# Patient Record
Sex: FEMALE | Race: BLACK OR AFRICAN AMERICAN | Employment: OTHER | URBAN - NONMETROPOLITAN AREA
[De-identification: names, ages, dates, MRNs, and addresses within clinical notes are randomized per-mention and may not be internally consistent; named-entity substitution may affect disease eponyms.]

---

## 2024-11-06 ENCOUNTER — HOSPITAL ENCOUNTER (EMERGENCY)
Facility: HOSPITAL | Age: 79
Discharge: NURSING, REHAB OR LONG-TERM CARE | End: 2024-11-06
Payer: MEDICARE

## 2024-11-06 VITALS
SYSTOLIC BLOOD PRESSURE: 148 MMHG | WEIGHT: 123 LBS | HEIGHT: 64 IN | OXYGEN SATURATION: 99 % | DIASTOLIC BLOOD PRESSURE: 67 MMHG | RESPIRATION RATE: 18 BRPM | HEART RATE: 69 BPM | TEMPERATURE: 97 F | BODY MASS INDEX: 21 KG/M2

## 2024-11-06 DIAGNOSIS — R29.6 UNWITNESSED FALL: Primary | ICD-10-CM

## 2024-11-06 DIAGNOSIS — S00.83XA FOREHEAD CONTUSION, INITIAL ENCOUNTER: ICD-10-CM

## 2024-11-06 PROCEDURE — 99285 EMERGENCY DEPT VISIT HI MDM: CPT | Mod: 25

## 2024-11-06 PROCEDURE — 99284 EMERGENCY DEPT VISIT MOD MDM: CPT | Mod: GF | Performed by: NURSE PRACTITIONER

## 2024-11-06 RX ORDER — NAPROXEN SODIUM 220 MG/1
81 TABLET, FILM COATED ORAL DAILY
COMMUNITY

## 2024-11-06 RX ORDER — METOPROLOL TARTRATE 50 MG/1
50 TABLET ORAL 2 TIMES DAILY
COMMUNITY

## 2024-11-06 RX ORDER — QUETIAPINE FUMARATE 25 MG/1
25 TABLET, FILM COATED ORAL 2 TIMES DAILY
COMMUNITY

## 2024-11-06 RX ORDER — TRAZODONE HYDROCHLORIDE 50 MG/1
50 TABLET ORAL NIGHTLY
COMMUNITY

## 2024-11-06 RX ORDER — POTASSIUM CHLORIDE 750 MG/1
10 CAPSULE, EXTENDED RELEASE ORAL ONCE
COMMUNITY

## 2024-11-06 RX ORDER — ATORVASTATIN CALCIUM 40 MG/1
40 TABLET, FILM COATED ORAL DAILY
COMMUNITY

## 2024-11-06 RX ORDER — LEVETIRACETAM 500 MG/1
500 TABLET ORAL 2 TIMES DAILY
COMMUNITY

## 2024-11-06 RX ORDER — HYDROCHLOROTHIAZIDE 25 MG/1
25 TABLET ORAL DAILY
COMMUNITY

## 2024-11-06 NOTE — ED NOTES
Dispatch notified of need for transport to [facility]. Awaiting [facility] arrival.

## 2024-11-06 NOTE — ED TRIAGE NOTES
Received call from Leela at [facility].   States patient was in bed when the aide went into the room to check on her. Patient was found on the floor.    Patient has hematoma to left forehead with no other complaints.  Int to left hand on arrival to emergency department.    Medical and surgical hx received from Leela at [facility].

## 2024-11-06 NOTE — DISCHARGE INSTRUCTIONS
Tylenol as needed for pain. Ice in short intervals to affected area. Follow up with her primary care provider if no improvement or otherwise as needed. Return to the ED for worsening signs and symptoms or otherwise as needed.

## 2024-11-06 NOTE — ED NOTES
Patient is awake and alert trying to get out of bed.   Repositioned in bed and covered with blanket.

## 2024-11-07 ENCOUNTER — HOSPITAL ENCOUNTER (EMERGENCY)
Facility: HOSPITAL | Age: 79
Discharge: HOME | End: 2024-11-07
Payer: MEDICARE

## 2024-11-07 VITALS
BODY MASS INDEX: 21.48 KG/M2 | RESPIRATION RATE: 17 BRPM | OXYGEN SATURATION: 97 % | SYSTOLIC BLOOD PRESSURE: 229 MMHG | HEIGHT: 63 IN | WEIGHT: 121.25 LBS | HEART RATE: 74 BPM | DIASTOLIC BLOOD PRESSURE: 92 MMHG | TEMPERATURE: 98 F

## 2024-11-07 DIAGNOSIS — S09.90XA INJURY OF HEAD, INITIAL ENCOUNTER: ICD-10-CM
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
## 2024-11-07 PROCEDURE — 99282 EMERGENCY DEPT VISIT SF MDM: CPT | Mod: GF

## 2024-11-07 PROCEDURE — 99285 EMERGENCY DEPT VISIT HI MDM: CPT | Mod: 25

## 2024-11-07 PROCEDURE — G0390 TRAUMA RESPONS W/HOSP CRITI: HCPCS

## 2024-11-08 NOTE — DISCHARGE INSTRUCTIONS
- Follow up with pcp as needed   - Return to the ED if patient experiences any projectile vomiting and/or seizure activity.

## 2024-11-08 NOTE — ED PROVIDER NOTES
Encounter Date: 11/7/2024       History     Chief Complaint   Patient presents with    Fall    Head Injury     BM is a 80 y/o AAF with a PMHx significant for multiple health related co-morbidities presents to the ED via EMS s/p fall with head injury. All collateral information comes from the nursing home staff who stated patient fell and was found the floor. Patient has recurrent falls, patient was here yesterday for similar complaints. Nursing Home staff denies any LOC, patient has large hematoma to the right side of her forehead. [facility] trauma activation paged over head, patient is taking Eliquis due to A-Fib.         Review of patient's allergies indicates:  No Known Allergies  Past Medical History:   Diagnosis Date    Atrial fibrillation     Diabetes mellitus     GERD (gastroesophageal reflux disease)     Hypertension     Mixed hyperlipidemia     Seizures     Stroke      History reviewed. No pertinent surgical history.  No family history on file.  Social History     Tobacco Use    Smoking status: Never     Passive exposure: Never    Smokeless tobacco: Never   Substance Use Topics    Alcohol use: Never    Drug use: Never     Review of Systems   Constitutional: Negative.    HENT: Negative.     Eyes: Negative.    Respiratory: Negative.     Cardiovascular: Negative.    Gastrointestinal: Negative.    Endocrine: Negative.    Genitourinary: Negative.    Musculoskeletal: Negative.    Skin:  Positive for wound.        Hematoma right side of forehead.    Allergic/Immunologic: Negative.    Neurological: Negative.    Hematological: Negative.    Psychiatric/Behavioral: Negative.         Physical Exam     Initial Vitals [11/07/24 1930]   BP Pulse Resp Temp SpO2   (!) 207/87 72 16 97.8 °F (36.6 °C) 97 %      MAP       --         Physical Exam    Nursing note and vitals reviewed.  Constitutional: Vital signs are normal. She appears well-developed and well-nourished. She is not diaphoretic. She is cooperative.  Non-toxic  appearance. She does not have a sickly appearance. She does not appear ill. No distress.   HENT:   Head: Normocephalic and atraumatic.       Right Ear: Hearing, tympanic membrane, external ear and ear canal normal.   Left Ear: Hearing, tympanic membrane, external ear and ear canal normal.   Nose: Nose normal. Right sinus exhibits no maxillary sinus tenderness and no frontal sinus tenderness. Left sinus exhibits no maxillary sinus tenderness and no frontal sinus tenderness. Mouth/Throat: Uvula is midline, oropharynx is clear and moist and mucous membranes are normal.   Neck: Trachea normal and phonation normal. Neck supple. No thyroid mass and no thyromegaly present.   Normal range of motion.   Full passive range of motion without pain.     Cardiovascular:  Normal rate, regular rhythm, S1 normal, S2 normal, normal heart sounds and normal pulses.           Pulmonary/Chest: Effort normal and breath sounds normal.   Musculoskeletal:      Cervical back: Full passive range of motion without pain, normal range of motion and neck supple.     Neurological: She is alert and oriented to person, place, and time. She has normal strength and normal reflexes. She displays normal reflexes. No cranial nerve deficit or sensory deficit. She displays a negative Romberg sign. GCS eye subscore is 4. GCS verbal subscore is 5. GCS motor subscore is 6.   Skin: Skin is warm, dry and intact. Capillary refill takes less than 2 seconds. No rash noted.        Psychiatric: She has a normal mood and affect. Her speech is normal and behavior is normal. Judgment and thought content normal. Cognition and memory are normal.         Medical Screening Exam   See Full Note    ED Course   Procedures  Labs Reviewed - No data to display       Imaging Results              CT Head Without Contrast (Final result)  Result time 11/07/24 20:09:51      Final result by Fidel Dey MD (11/07/24 20:09:51)                   Impression:      Right frontal  subcutaneous hematoma.  No calvarial fracture or acute intracranial process.      Electronically signed by: Fidel Dey MD  Date:    11/07/2024  Time:    20:09               Narrative:    EXAMINATION:  CT HEAD WITHOUT CONTRAST    CLINICAL HISTORY:  fall with head injury.    TECHNIQUE:  Low dose axial images were obtained through the head.  Coronal and sagittal reformations were also performed. Contrast was not administered.    COMPARISON:  CT head dated 11/06/2024.    FINDINGS:  There is a right frontal subcutaneous hematoma.  The underlying calvarium is intact.  The paranasal sinuses are unremarkable.  The mastoid air cells are clear.  There are postoperative changes in the globes.    The craniocervical junction is intact.  The sellar and parasellar structures are within normal limits.  There is no evidence of intracranial hemorrhage.  There are no extra-axial fluid collections.    The ventricles and sulci are prominent, consistent cerebral volume loss.  There are hypodensities within the periventricular and subcortical white matter.  The gray-white differentiation is maintained.  There is no dense vessel sign.  There are calcifications of the skull base vessels.  There is no evidence of mass effect.                                    X-Rays:   Independently Interpreted Readings:   Other Readings:  ding Physician Reading Date Result Priority  Fidel Dey MD  [phone]  [phone] 11/7/2024 STAT    Narrative & Impression  EXAMINATION:  CT HEAD WITHOUT CONTRAST     CLINICAL HISTORY:  fall with head injury.     TECHNIQUE:  Low dose axial images were obtained through the head.  Coronal and sagittal reformations were also performed. Contrast was not administered.     COMPARISON:  CT head dated 11/06/2024.     FINDINGS:  There is a right frontal subcutaneous hematoma.  The underlying calvarium is intact.  The paranasal sinuses are unremarkable.  The mastoid air cells are clear.  There are postoperative changes  in the globes.     The craniocervical junction is intact.  The sellar and parasellar structures are within normal limits.  There is no evidence of intracranial hemorrhage.  There are no extra-axial fluid collections.     The ventricles and sulci are prominent, consistent cerebral volume loss.  There are hypodensities within the periventricular and subcortical white matter.  The gray-white differentiation is maintained.  There is no dense vessel sign.  There are calcifications of the skull base vessels.  There is no evidence of mass effect.     Impression:     Right frontal subcutaneous hematoma.  No calvarial fracture or acute intracranial process.        Electronically signed by:Fidel Dey MD  Date:                                            11/07/2024  Time:                                           20:09      Exam Ended: 11/07/24 19:54 CST        Medications - No data to display  Medical Decision Making  BM is a 80 y/o AAF with a PMHx significant for multiple health related co-morbidities presents to the ED via EMS s/p fall with head injury. All collateral information comes from the nursing home staff who stated patient fell and was found the floor. Patient has recurrent falls, patient was here yesterday for similar complaints. Nursing Home staff denies any LOC, patient has large hematoma to the right side of her forehead. [facility] trauma activation paged over head, patient is taking Eliquis due to A-Fib.           Amount and/or Complexity of Data Reviewed  Radiology: ordered. Decision-making details documented in ED Course.  Discussion of management or test interpretation with external provider(s): - Head injury s/p fall  - Fall  - Hematoma right forehead                                         Clinical Impression:   Final diagnoses:  [W19.XXXA] Fall, initial encounter (Primary)  [S09.90XA] Injury of head, initial encounter        ED Disposition Condition    Discharge Stable          ED Prescriptions    None        Follow-up Information       Follow up With Specialties Details Why Contact Info    local pcp   As needed, If symptoms worsen              Darren Betancourt FNP  11/07/24 2022

## 2024-11-08 NOTE — ED PROVIDER NOTES
Encounter Date: 11/6/2024       History     Chief Complaint   Patient presents with    Fall     Hematoma to left forehead     80 y/o AAF presents to the emergency department via EMS from local NH with c/o having an unwitnessed fall from her bed. She was given her night time medications and when the nursing staff went back to check on her and she was on the floor. No obvious injuries reported. Sent to the emergency department for further evaluation d/t patient being on Eliquis.    The history is provided by the nursing home and the EMS personnel.     Review of patient's allergies indicates:  No Known Allergies  Past Medical History:   Diagnosis Date    Atrial fibrillation     Diabetes mellitus     GERD (gastroesophageal reflux disease)     Hypertension     Mixed hyperlipidemia     Seizures     Stroke      History reviewed. No pertinent surgical history.  No family history on file.  Social History     Tobacco Use    Smoking status: Never     Passive exposure: Never    Smokeless tobacco: Never   Substance Use Topics    Alcohol use: Never    Drug use: Never     Review of Systems   All other systems reviewed and are negative.      Physical Exam     Initial Vitals [11/06/24 0100]   BP Pulse Resp Temp SpO2   (!) 146/69 72 18 97.4 °F (36.3 °C) 99 %      MAP       --         Physical Exam    Constitutional: She appears well-developed and well-nourished. She is easily aroused.  Non-toxic appearance.   HENT:   Head:       Eyes: Pupils are equal, round, and reactive to light.   Neck:    Full passive range of motion without pain.     Cardiovascular:  Regular rhythm and normal heart sounds.           Pulmonary/Chest: Effort normal and breath sounds normal.   Musculoskeletal:      Cervical back: Full passive range of motion without pain. No spinous process tenderness.     Neurological: She is easily aroused.   Drowsy but arousable  Mental status at baseline     Skin: Skin is warm, dry and intact. Capillary refill takes less than 2  seconds.              Medical Screening Exam   See Full Note    ED Course   Procedures  Labs Reviewed - No data to display       Imaging Results              CT Head Without Contrast (Final result)  Result time 11/06/24 04:59:30      Final result by Florentin Elliott MD (11/06/24 04:59:30)                   Impression:      No CT evidence of acute intracranial abnormality.    Generalized cerebral volume loss and chronic microvascular ischemic changes.      Electronically signed by: Florentin Elliott MD  Date:    11/06/2024  Time:    04:59               Narrative:    EXAMINATION:  CT HEAD WITHOUT CONTRAST    CLINICAL HISTORY:  Unwitnessed fall on Eliquis;    TECHNIQUE:  Low dose axial images were obtained through the head.  Coronal and sagittal reformations were also performed. Contrast was not administered.    COMPARISON:  None.    FINDINGS:  Generalized cerebral volume loss with ex vacuo dilation of the ventricles and sulci.  Mild patchy periventricular white matter hypoattenuation suggestive of chronic microvascular ischemic change, though nonspecific.    No evidence of acute territorial infarct, hemorrhage, mass effect, or midline shift.  Small remote lacunar infarcts in the cerebellum.    Ventricles are not significantly enlarged.    No displaced calvarial fracture.  Mild scalp soft tissue edema.    Atherosclerotic vascular calcifications at the skull base.    Visualized paranasal sinuses and mastoid air cells are essentially clear.                                       CT Cervical Spine Without Contrast (Final result)  Result time 11/06/24 05:03:02      Final result by Florentin Elliott MD (11/06/24 05:03:02)                   Impression:      No acute cervical fracture.      Electronically signed by: Florentin Elliott MD  Date:    11/06/2024  Time:    05:03               Narrative:    EXAMINATION:  CT CERVICAL SPINE WITHOUT CONTRAST    CLINICAL HISTORY:  Unwitnessed fall;    TECHNIQUE:  Low dose axial images,  sagittal and coronal reformations were performed though the cervical spine.  Contrast was not administered.    COMPARISON:  None.    FINDINGS:    Grossly normal sagittal curvature and alignment.  Vertebral body heights are relatively well maintained.  Moderate degenerative changes in the cervical spine, most notable at C5-C6 and C6-C7.  No severe central canal stenosis.  No acute fracture identified.  Prevertebral soft tissues are normal.  Lung apices are clear.                                       Medications - No data to display  Medical Decision Making  78 y/o AAF presents to the emergency department via EMS from local NH with c/o having an unwitnessed fall from her bed. She was given her night time medications and when the nursing staff went back to check on her and she was on the floor. No obvious injuries reported. Sent to the emergency department for further evaluation d/t patient being on Eliquis.    Problems Addressed:  Unwitnessed fall:     Details: CT head and C spine negative. Follow up instructions given. Warning s/s discussed and return precautions given to NH staff.      Amount and/or Complexity of Data Reviewed  Radiology: ordered.    Risk  OTC drugs.  Prescription drug management.                                      Clinical Impression:   Final diagnoses:  [R29.6] Unwitnessed fall (Primary)  [S00.83XA] Forehead contusion, initial encounter        ED Disposition Condition    Discharge Stable          ED Prescriptions    None       Follow-up Information       Follow up With Specialties Details Why Contact Info    Primary Care Provider   As needed              Shivani Dunn FNP  11/08/24 [id number]